# Patient Record
Sex: MALE | Race: BLACK OR AFRICAN AMERICAN | NOT HISPANIC OR LATINO | Employment: PART TIME | URBAN - METROPOLITAN AREA
[De-identification: names, ages, dates, MRNs, and addresses within clinical notes are randomized per-mention and may not be internally consistent; named-entity substitution may affect disease eponyms.]

---

## 2017-01-03 ENCOUNTER — HOSPITAL ENCOUNTER (OUTPATIENT)
Dept: RADIOLOGY | Facility: OTHER | Age: 22
Discharge: HOME | End: 2017-01-03
Attending: NURSE PRACTITIONER
Payer: COMMERCIAL

## 2017-01-03 DIAGNOSIS — K75.4 AUTOIMMUNE HEPATITIS: ICD-10-CM

## 2017-01-03 PROCEDURE — 76700 US EXAM ABDOM COMPLETE: CPT | Mod: TC

## 2017-01-03 PROCEDURE — 76700 US EXAM ABDOM COMPLETE: CPT | Mod: 26,,, | Performed by: RADIOLOGY

## 2017-01-04 ENCOUNTER — TELEPHONE (OUTPATIENT)
Dept: GASTROENTEROLOGY | Facility: CLINIC | Age: 22
End: 2017-01-04

## 2018-01-22 ENCOUNTER — HOSPITAL ENCOUNTER (EMERGENCY)
Facility: OTHER | Age: 23
Discharge: HOME | End: 2018-01-22
Attending: EMERGENCY MEDICINE
Payer: COMMERCIAL

## 2018-01-22 VITALS
OXYGEN SATURATION: 99 % | HEIGHT: 72 IN | WEIGHT: 220 LBS | BODY MASS INDEX: 29.8 KG/M2 | RESPIRATION RATE: 18 BRPM | HEART RATE: 82 BPM | DIASTOLIC BLOOD PRESSURE: 85 MMHG | SYSTOLIC BLOOD PRESSURE: 146 MMHG | TEMPERATURE: 98 F

## 2018-01-22 DIAGNOSIS — K59.00 CONSTIPATION: ICD-10-CM

## 2018-01-22 DIAGNOSIS — B34.9 VIRAL SYNDROME: Primary | ICD-10-CM

## 2018-01-22 DIAGNOSIS — R09.1 PLEURISY: ICD-10-CM

## 2018-01-22 LAB
ALBUMIN SERPL BCP-MCNC: 4 G/DL
ALP SERPL-CCNC: 98 U/L
ALT SERPL W/O P-5'-P-CCNC: 24 U/L
ANION GAP SERPL CALC-SCNC: 11 MMOL/L
AST SERPL-CCNC: 28 U/L
BASOPHILS # BLD AUTO: 0.02 K/UL
BASOPHILS NFR BLD: 0.2 %
BILIRUB SERPL-MCNC: 0.7 MG/DL
BILIRUB UR QL STRIP: NEGATIVE
BUN SERPL-MCNC: 11 MG/DL
CALCIUM SERPL-MCNC: 10.1 MG/DL
CHLORIDE SERPL-SCNC: 101 MMOL/L
CLARITY UR: CLEAR
CO2 SERPL-SCNC: 26 MMOL/L
COLOR UR: YELLOW
CREAT SERPL-MCNC: 1 MG/DL
DIFFERENTIAL METHOD: ABNORMAL
EOSINOPHIL # BLD AUTO: 0.1 K/UL
EOSINOPHIL NFR BLD: 0.7 %
ERYTHROCYTE [DISTWIDTH] IN BLOOD BY AUTOMATED COUNT: 11.7 %
EST. GFR  (AFRICAN AMERICAN): >60 ML/MIN/1.73 M^2
EST. GFR  (NON AFRICAN AMERICAN): >60 ML/MIN/1.73 M^2
FLUAV AG SPEC QL IA: NEGATIVE
FLUBV AG SPEC QL IA: NEGATIVE
GLUCOSE SERPL-MCNC: 98 MG/DL
GLUCOSE UR QL STRIP: NEGATIVE
HCT VFR BLD AUTO: 41.7 %
HGB BLD-MCNC: 14.7 G/DL
HGB UR QL STRIP: ABNORMAL
KETONES UR QL STRIP: NEGATIVE
LEUKOCYTE ESTERASE UR QL STRIP: NEGATIVE
LIPASE SERPL-CCNC: 8 U/L
LYMPHOCYTES # BLD AUTO: 1.5 K/UL
LYMPHOCYTES NFR BLD: 18.8 %
MCH RBC QN AUTO: 32 PG
MCHC RBC AUTO-ENTMCNC: 35.3 G/DL
MCV RBC AUTO: 91 FL
MICROSCOPIC COMMENT: ABNORMAL
MONOCYTES # BLD AUTO: 0.8 K/UL
MONOCYTES NFR BLD: 10.1 %
NEUTROPHILS # BLD AUTO: 5.7 K/UL
NEUTROPHILS NFR BLD: 69.8 %
NITRITE UR QL STRIP: NEGATIVE
PH UR STRIP: 6 [PH] (ref 5–8)
PLATELET # BLD AUTO: 186 K/UL
PMV BLD AUTO: 10 FL
POTASSIUM SERPL-SCNC: 3.7 MMOL/L
PROT SERPL-MCNC: 9.7 G/DL
PROT UR QL STRIP: ABNORMAL
RBC # BLD AUTO: 4.59 M/UL
RBC #/AREA URNS HPF: 8 /HPF (ref 0–4)
SODIUM SERPL-SCNC: 138 MMOL/L
SP GR UR STRIP: 1.02 (ref 1–1.03)
SPECIMEN SOURCE: NORMAL
SQUAMOUS #/AREA URNS HPF: 2 /HPF
TROPONIN I SERPL DL<=0.01 NG/ML-MCNC: 0.01 NG/ML
URN SPEC COLLECT METH UR: ABNORMAL
UROBILINOGEN UR STRIP-ACNC: ABNORMAL EU/DL
WBC # BLD AUTO: 8.19 K/UL
WBC #/AREA URNS HPF: 1 /HPF (ref 0–5)
WBC CLUMPS URNS QL MICRO: ABNORMAL

## 2018-01-22 PROCEDURE — 93010 ELECTROCARDIOGRAM REPORT: CPT | Mod: ,,, | Performed by: INTERNAL MEDICINE

## 2018-01-22 PROCEDURE — 25000003 PHARM REV CODE 250: Performed by: PHYSICIAN ASSISTANT

## 2018-01-22 PROCEDURE — 93005 ELECTROCARDIOGRAM TRACING: CPT

## 2018-01-22 PROCEDURE — 81000 URINALYSIS NONAUTO W/SCOPE: CPT

## 2018-01-22 PROCEDURE — 99284 EMERGENCY DEPT VISIT MOD MDM: CPT | Mod: 25

## 2018-01-22 PROCEDURE — 85025 COMPLETE CBC W/AUTO DIFF WBC: CPT

## 2018-01-22 PROCEDURE — 84484 ASSAY OF TROPONIN QUANT: CPT

## 2018-01-22 PROCEDURE — 87086 URINE CULTURE/COLONY COUNT: CPT

## 2018-01-22 PROCEDURE — 80053 COMPREHEN METABOLIC PANEL: CPT

## 2018-01-22 PROCEDURE — 83690 ASSAY OF LIPASE: CPT

## 2018-01-22 PROCEDURE — 87491 CHLMYD TRACH DNA AMP PROBE: CPT

## 2018-01-22 PROCEDURE — 87400 INFLUENZA A/B EACH AG IA: CPT

## 2018-01-22 PROCEDURE — 25500020 PHARM REV CODE 255: Performed by: EMERGENCY MEDICINE

## 2018-01-22 RX ORDER — IBUPROFEN 400 MG/1
800 TABLET ORAL
Status: COMPLETED | OUTPATIENT
Start: 2018-01-22 | End: 2018-01-22

## 2018-01-22 RX ORDER — DOCUSATE SODIUM 100 MG/1
100 CAPSULE, LIQUID FILLED ORAL 2 TIMES DAILY
Qty: 30 CAPSULE | Refills: 0 | Status: SHIPPED | OUTPATIENT
Start: 2018-01-22

## 2018-01-22 RX ADMIN — IOHEXOL 100 ML: 350 INJECTION, SOLUTION INTRAVENOUS at 08:01

## 2018-01-22 RX ADMIN — IBUPROFEN 800 MG: 400 TABLET, FILM COATED ORAL at 06:01

## 2018-01-22 NOTE — ED NOTES
"Pt with multiple complaints. Pt reporting Rectal pain, Midline chest pain with SOB, Pt states "I been feeling a little tinkling sensation when I pee, I have no discharge from my penis." Reports of fever and chills. Pt also with RLQ pain described as cramping, pt states " I haven't had a bowel movement in a couple days." Denies blood in stool. Denies vomiting. Pt also reporting decreased appetite and weakness. Pt with mild increase in RR. Pt AAOx4 and appropriate at this time. Respirations even and unlabored. No acute distress noted.    "

## 2018-01-23 LAB
C TRACH DNA SPEC QL NAA+PROBE: DETECTED
N GONORRHOEA DNA SPEC QL NAA+PROBE: NOT DETECTED

## 2018-01-23 NOTE — ED PROVIDER NOTES
"Encounter Date: 1/22/2018       History     Chief Complaint   Patient presents with    Pleurisy     " I was just discharged from the hosptial for Pneumonia about three weeks ago. I just got that pressure feeling in my chest again"> Denies increased SOB, dizziness, or blurred vision at this time     Patient is a 22-year-old male with automated hepatitis and cardiomyopathy who presents to the ED with multiple complaints.  Patient reports shortness of breath and left-sided chest pain that is been constant for the past 3 days.  Patient states he was diagnosed with pneumonia approximately 3 weeks ago he states he finished outpatient antibiotics one week ago.  He reports general malaise, decreased appetite and subjective fever and chills.  Patient also reports chronic, intermittent rectal pain.  He reports he has not had a bowel movement in 2 days.  He reports intermittent "crampy" right lower quadrant abdominal pain.  He states he noticed blood with wiping last week.  Reports a history of hemorrhoids.  He also reports nonspecific urinary complaint.  He states he feels like there is tingling.  He denies any penile discharge.  He denies nausea or vomiting.      The history is provided by the patient.     Review of patient's allergies indicates:   Allergen Reactions    Zithromax [azithromycin] Hives     Past Medical History:   Diagnosis Date    Autoimmune hepatitis     Pneumonia      Past Surgical History:   Procedure Laterality Date    FOOT SURGERY      "they took a bone out"    NASAL SINUS SURGERY      TONSILLECTOMY       History reviewed. No pertinent family history.  Social History   Substance Use Topics    Smoking status: Former Smoker    Smokeless tobacco: Never Used    Alcohol use Yes      Comment: occ     Review of Systems   Constitutional: Positive for appetite change, chills, fatigue and fever.   HENT: Negative for congestion and sore throat.    Eyes: Negative for pain.   Respiratory: Positive for " shortness of breath. Negative for cough.    Cardiovascular: Positive for chest pain.   Gastrointestinal: Positive for abdominal pain and blood in stool. Negative for diarrhea, nausea and vomiting.   Genitourinary: Negative for discharge, dysuria, frequency, genital sores, penile pain, penile swelling, scrotal swelling and urgency.   Musculoskeletal: Negative for arthralgias.   Skin: Negative for rash.   Neurological: Negative for headaches.       Physical Exam     Initial Vitals [01/22/18 1532]   BP Pulse Resp Temp SpO2   (!) 140/85 82 18 (!) 100.5 °F (38.1 °C) 99 %      MAP       103.33         Physical Exam    Constitutional: He is cooperative.   -American male in no acute distress.   HENT:   Head: Normocephalic and atraumatic.   Mouth/Throat: Oropharynx is clear and moist.   Eyes: Conjunctivae and EOM are normal. Pupils are equal, round, and reactive to light.   Neck: Normal range of motion. Neck supple.   Cardiovascular: Normal rate, regular rhythm and intact distal pulses.   No murmur heard.  Pulmonary/Chest: Breath sounds normal. He has no wheezes. He has no rhonchi. He has no rales.   Abdominal: Soft. Bowel sounds are normal.   Tenderness to McBurney's point.  No rebound tenderness or guarding.    Genitourinary:   Genitourinary Comments: Rectal exam performed with a chaperone, non thrombosed hemorrhoid at the 3 o'clock position.  No evidence of impaction.  Hemoccult positive.   Musculoskeletal: Normal range of motion. He exhibits no edema.   Neurological: He is alert and oriented to person, place, and time. He has normal strength. GCS eye subscore is 4. GCS verbal subscore is 5. GCS motor subscore is 6.   Skin: Skin is warm and dry. Capillary refill takes less than 2 seconds. No rash noted.   Psychiatric: He has a normal mood and affect. His behavior is normal.         ED Course   Procedures  Labs Reviewed   CBC W/ AUTO DIFFERENTIAL - Abnormal; Notable for the following:        Result Value    RBC 4.59  (*)     MCH 32.0 (*)     All other components within normal limits   COMPREHENSIVE METABOLIC PANEL - Abnormal; Notable for the following:     Total Protein 9.7 (*)     All other components within normal limits   URINALYSIS - Abnormal; Notable for the following:     Protein, UA Trace (*)     Occult Blood UA 2+ (*)     Urobilinogen, UA 2.0-3.0 (*)     All other components within normal limits   URINALYSIS MICROSCOPIC - Abnormal; Notable for the following:     RBC, UA 8 (*)     All other components within normal limits   C. TRACHOMATIS/N. GONORRHOEAE BY AMP DNA   LIPASE   INFLUENZA A AND B ANTIGEN   TROPONIN I     EKG Readings: (Independently Interpreted)   Normal sinus rhythm at a rate of 85 bpm.  Peqaked T-wave in leads V2 and V3.  No STEMI.  No previous EKG to compare to.          Medical Decision Making:   Initial Assessment:   Urgent evaluation of a 22 y.o. male presenting to the emergency department complaining of multiple complaints. Patient is afebrile, nontoxic appearing and hemodynamically stable.  ED Management:  Patient with multiple, nonspecific complaints.  Flu swab is negative.  Chest x-ray reveals no acute cardiopulmonary process.  EKG reveals no ischemic changes.  Abdominal x-ray reveals nonobstructive bowel gas pattern.  No evidence of obstruction or free air.  Urinalysis reveals no UTI.  Urine sent for culture and G&C. CMP is without abnormalities.  Lipase is normal.  No leukocytosis.  Influenza screen is negative.  CT abdomen revealed no acute intra-abdominal abnormalities, specifically no appendicitis.  Patient's symptoms are likely secondary to acute viral syndrome.  I have advised on symptomatic care for his constipation and viral symptoms.  Patient's fever resolved with Motrin given here in the ED.  Advised to follow-up with his primary care as needed.  Have given specific return precautions for new or worsening symptoms. I have discussed the patient with the attending thoroughly and he/she  agrees to the treatment and plan.   Other:   I have discussed this case with another health care provider.  This note was created using Dragon Medical Dictation. There may be typographical errors secondary to dictation.                    ED Course      Clinical Impression:     1. Viral syndrome    2. Pleurisy    3. Constipation                             Terry Randolph PA-C  01/22/18 2116

## 2018-01-24 LAB — BACTERIA UR CULT: NORMAL

## 2018-01-24 NOTE — PROGRESS NOTES
1:57 PM not treated in the emergency department.  Call in prescription for azithromycin to patient's pharmacy.  He is urged to follow-up in one week to assure clearance and inform all sexual contacts.  He states understanding. MARYBEL FARRELL

## 2018-04-24 ENCOUNTER — TELEPHONE (OUTPATIENT)
Dept: GASTROENTEROLOGY | Facility: CLINIC | Age: 23
End: 2018-04-24

## 2018-04-24 NOTE — TELEPHONE ENCOUNTER
Left a message for patient to call the office back. Patient needs to be scheduled for an follow up OV.

## 2018-04-24 NOTE — TELEPHONE ENCOUNTER
----- Message from Lucia Trinidad sent at 4/24/2018  9:47 AM CDT -----  Contact: self/[phone]  Patient is returning your call.      Please call and advise.

## 2018-04-24 NOTE — TELEPHONE ENCOUNTER
----- Message from Elisabeth May sent at 4/24/2018  9:30 AM CDT -----  Contact: [phone]/self  Patient is requesting orders for blood work. Please advise.